# Patient Record
(demographics unavailable — no encounter records)

---

## 2017-08-27 NOTE — EMERGENCY ROOM REPORT
History of Present Illness


General


Chief Complaint:  Pain


Source:  Patient





Present Illness


HPI


Patient was dragging groceries up stairs and twisted her knee.  She heard a 

pop.  It started swelling last night and there was pain.  She is unable to bend 

the knee fully.  She was using an immobilizer and ice last night and the 

swelling went down a bit.  She still has pain and limited motion.  Is unable to 

get into the car because of immobilizer.  She's had an Ace wrap on after that.  

The pain is 8/10.  It's worse when she tries to bend her knee and weight bear.  

It's aching and sharp.  It does not radiate.





The patient had meniscal surgery a month ago.  She was just finishing up a 

physical therapy.  She's not been able to go back to work because of prior 

injury.





No fevers, chills, dyspnea, rashes, blood thinners, cough, hemoptysis.





She is decreasing her smoking.


Allergies:  


Coded Allergies:  


     MEPERIDINE (Verified  Allergy, Intermediate, Hives, 2/25/13)


 COPIED FROM UNCODED SECTION


     PANTOPRAZOLE (Verified  Allergy, Intermediate, 7/16/17)


     MORPHINE (Verified  Allergy, Unknown, ITCHING, 7/16/17)


     METOCLOPRAMIDE (Verified  Adverse Reaction, Intermediate, "MY MUSCLE 

MUSCLE ARE FREEZING UP", 2/25/13)


 COPIED FROM UNCODED SECTION





Patient History


Past Medical History:  see triage record


Past Surgical History:  other - L knee surgery


Social History:  Reports: smoking


Social History Narrative


Veterinary tech


Pregnant Now:  No


Reviewed Nursing Documentation:  PMH: Agreed, PSxH: Agreed





Nursing Documentation-PMH


Hx Cardiac Problems:  No


Hx Hypertension:  No


Hx Asthma:  No


Hx COPD:  No


Hx Diabetes:  No


Hx Cancer:  No


Hx Gastrointestinal Problems:  Yes - ulcer


Hx Dialysis:  No


Hx Neurological Problems:  No


Hx Cerebrovascular Accident:  No


Hx Transient Ischemic Attacks:  No


Hx Dementia:  No


Hx Alzheimer's Disease:  No


Hx Parkinson's Disease:  No


Hx Meningitis:  No


Hx Encephalitis:  No


Hx Seizures:  No


Hx Epilepsy:  No


Hx Multiple Sclerosis:  No


Hx Cerebral Palsy:  No


Hx Amyotrophic Lat Sclerosis:  No


Hx Guillian-[place] Syndrome:  No


Hx Paralysis:  No


Hx Peripheral Neuropathy:  No


Hx Spinal Cord Injury:  No


Hx Head Trauma:  No


Hx Traumatic Brain Injury:  No


Hx Memory Loss:  No


Hx Concentration Difficulty:  No


Hx Speech Problem:  No


Hx Tremors:  No


Hx Vertigo:  No


Hx Dizziness:  No


Hx Syncope:  No


Hx Headaches:  Yes - POST SUBDURAL HEMATOMA REMOVAL


Hx Aphasia:  No


Hx Dysphasia:  No


Hx Numbness:  No


Hx Weakness:  No


Hx Fatigue:  No


Hx Neurologic Surgery:  Yes - HAD SUBDURAL HEMATOMA REMOVED


Hx Brain Shunt:  No





Review of Systems


Constitutional:  Denies: fever


Respiratory:  Denies: shortness of breath


Cardiovascular:  Denies: chest pain


Gastrointestinal:  Denies: nausea


Musculoskeletal:  Reports: see HPI


Skin:  Denies: rash


Psychiatric:  Reports: depressed feelings


Neurological:  Denies: numbness


All Other Systems:  negative except mentioned in HPI





Physical Exam





Vital Signs








  Date Time  Temp Pulse Resp B/P (MAP) Pulse Ox O2 Delivery O2 Flow Rate FiO2


 


8/27/17 13:40 98.2 95 15 103/63 99 Room Air  








Sp02 EP Interpretation:  reviewed, normal


General Appearance:  well appearing, no apparent distress, GCS 15


Head:  normocephalic, atraumatic


Eyes:  bilateral eye normal inspection, bilateral eye PERRL


ENT:  hearing grossly normal, normal voice, moist mucus membranes


Neck:  full range of motion, supple


Respiratory:  no respiratory distress, speaking full sentences


Cardiovascular #2:  2+ dorsalis pedis (L)


Musculoskeletal:  back normal, digits/nails normal, decreased range of mation, 

swelling, other - Medial tenderness.  Lateral laxity of ligamentous still 

intact.  Minimal effusion.  Minimal drawer sign.  Positive Apley's compression 

medially appeared


Neurologic:  alert, motor strength/tone normal, sensory intact, grossly normal


Psychiatric:  mood/affect normal - tearful


Skin:  no rash





Medical Decision Making


Diagnostic Impression:  


 Primary Impression:  


 Cartilage tear


 Additional Impression:  


 Knee sprain


 Qualified Codes:  S83.412A - Sprain of medial collateral ligament of left knee

, initial encounter


ER Course


The patient presents with a left knee injury.  She had recent surgery.  Her 

exam is consistent with a meniscal injury and medial collateral ligament sprain 

at this time.  X-rays are indicated to exclude fracture.  In addition she'll be 

treated with pain medication and stabilization the knee.  She does have an 

immobilizer and crutches at home.





X-ray does not reveal fracture.  There is a small effusion.





Ace wrap is applied by me and tension and position excellent -  neurovascular 

check by me normal.





Patient is stable for outpatient observation and treatment.


Other X-Ray Diagnostic Results


Other X-Ray Diagnostic Results :  


   X-Ray ordered:  L knee


   # of Views/Limited Vs Complete:  3 View


   Indication:  Pain


   EP Interpretation:  Yes


   Interpretation:  no dislocation, no fractures, other - STS


   Impression:  Other


   Interpreting ER Provider:  Electronically signed by Handy Pepe MD





Last Vital Signs








  Date Time  Temp Pulse Resp B/P (MAP) Pulse Ox O2 Delivery O2 Flow Rate FiO2


 


8/27/17 15:15 98.2 95 15 103/63 99 Room Air  








Status:  improved


Disposition:  HOME, SELF-CARE


Condition:  Improved


Scripts


Hydrocodone Bit/Acetaminophen 5-325* (NORCO 5-325*) 1 Each Tablet


1 TAB ORAL Q6H Y for For Pain, #16 TAB 0 Refills


   Prov: Handy Pepe M.D.         8/27/17 


Ibuprofen* (MOTRIN*) 600 Mg Tablet


600 MG ORAL Q6H Y for For Pain, #20 TAB


   Prov: Handy Pepe M.D.         8/27/17











Handy Pepe M.D. Aug 27, 2017 14:31

## 2017-10-22 NOTE — CARDIOLOGY REPORT
--------------- APPROVED REPORT --------------





EKG Measurement

Heart [id number]

DC [id number]

[id number]

[id number]

[id number]





Normal sinus rhythm

Normal ECG

## 2017-12-31 NOTE — EMERGENCY ROOM REPORT
History of Present Illness


General


Chief Complaint:  Gastrointestinal Bleed


Source:  Patient





Present Illness


HPI


Patient presents with complaints of epigastric abdominal pain


Patient reports that on one vomiting episode she has noticed some trace of blood


Denies any lower abdominal pain or rectal bleeding


Denies any flank pain


Patient provide significant history of peptic ulcer disease and gastric bypass





Denies any neck pain or photophobia denies any recent travel denies any trauma 

pain is 6/10 epigastric


Allergies:  


Coded Allergies:  


     MEPERIDINE (Verified  Allergy, Intermediate, Hives, 2/25/13)


 COPIED FROM UNCODED SECTION


     PANTOPRAZOLE (Verified  Allergy, Intermediate, 7/16/17)


     MORPHINE (Verified  Allergy, Unknown, ITCHING, 7/16/17)


     METOCLOPRAMIDE (Verified  Adverse Reaction, Intermediate, "MY MUSCLE 

MUSCLE ARE FREEZING UP", 2/25/13)


 COPIED FROM UNCODED SECTION





Patient History


Past Medical History:  see triage record


Pertinent Family History:  none


Last Menstrual Period:  na


Pregnant Now:  No


Reviewed Nursing Documentation:  PMH: Agreed, PSxH: Agreed





Nursing Documentation-PMH


Past Medical History:  No History, Except For


Hx Cardiac Problems:  No


Hx Hypertension:  No


Hx Asthma:  No


Hx COPD:  No


Hx Diabetes:  No


Hx Cancer:  No


Hx Gastrointestinal Problems:  Yes - ulcer, gastrectomy


Hx Dialysis:  No


Hx Neurological Problems:  No


Hx Cerebrovascular Accident:  No


Hx Transient Ischemic Attacks:  No


Hx Dementia:  No


Hx Alzheimer's Disease:  No


Hx Parkinson's Disease:  No


Hx Meningitis:  No


Hx Encephalitis:  No


Hx Seizures:  No


Hx Epilepsy:  No


Hx Multiple Sclerosis:  No


Hx Cerebral Palsy:  No


Hx Amyotrophic Lat Sclerosis:  No


Hx Guillian-[place] Syndrome:  No


Hx Paralysis:  No


Hx Peripheral Neuropathy:  No


Hx Spinal Cord Injury:  No


Hx Head Trauma:  No


Hx Traumatic Brain Injury:  No


Hx Memory Loss:  No


Hx Concentration Difficulty:  No


Hx Speech Problem:  No


Hx Tremors:  No


Hx Vertigo:  No


Hx Dizziness:  No


Hx Syncope:  No


Hx Headaches:  Yes - POST SUBDURAL HEMATOMA REMOVAL


Hx Aphasia:  No


Hx Dysphasia:  No


Hx Numbness:  No


Hx Weakness:  No


Hx Fatigue:  No


Hx Neurologic Surgery:  Yes - HAD SUBDURAL HEMATOMA REMOVED


Hx Brain Shunt:  No





Review of Systems


All Other Systems:  negative except mentioned in HPI





Physical Exam





Vital Signs








  Date Time  Temp Pulse Resp B/P (MAP) Pulse Ox O2 Delivery O2 Flow Rate FiO2


 


12/31/17 11:13 97.3 115 20 119/68 99 Room Air  








Sp02 EP Interpretation:  reviewed, normal


General Appearance:  well appearing, no apparent distress


Head:  normocephalic, atraumatic


Eyes:  bilateral eye PERRL, bilateral eye EOMI


ENT:  hearing grossly normal, normal pharynx, TMs + canals normal, uvula midline


Neck:  full range of motion, supple, no meningismus, no bony tend


Respiratory:  lungs clear, normal breath sounds, no rhonchi, no respiratory 

distress, no retraction, no accessory muscle use


Cardiovascular #1:  normal peripheral pulses, regular rate, rhythm, no edema, 

no gallop, no JVD, no murmur


Gastrointestinal:  normal bowel sounds, non tender - On clinical exam however 

subjectively uncomfortable epigastric region, soft, no mass, no organomegaly, 

non-distended, no guarding, no hernia, no pulsatile mass, no rebound


Genitourinary:  no CVA tenderness


Musculoskeletal:  normal inspection


Neurologic:  oriented x3, responsive, CNs III-XII nml as tested, motor strength/

tone normal, sensory intact


Psychiatric:  mood/affect normal


Skin:  normal color, no rash, warm/dry, palpation normal


Lymphatic:  normal inspection, no adenopathy





Medical Decision Making


Diagnostic Impression:  


 Primary Impression:  


 abdominal pain


ER Course


With the history exam and presentation, multiple differentials considered, 

including but not limited to appendicitis, gastritis, cholecystitis, 

diverticulitis





Patient appears well-hydrated


At this time is allergic to morphine reports that Dilaudid has worked in the 

past


Patient on CURES reveals multiple medications other different providers


I did discuss this with the patient my concern for this


Patient's hemoglobin is the same as a 4 months ago likelihood of acute 

hemorrhage is low patient maintained in a stable and requires improved 

outpatient followup





Labs








Test


  12/31/17


12:30


 


White Blood Count


  4.3 K/UL


(4.8-10.8)


 


Red Blood Count


  4.42 M/UL


(4.20-5.40)


 


Hemoglobin


  11.4 G/DL


(12.0-16.0)


 


Hematocrit


  37.2 %


(37.0-47.0)


 


Mean Corpuscular Volume 84 FL (80-99) 


 


Mean Corpuscular Hemoglobin


  25.8 PG


(27.0-31.0)


 


Mean Corpuscular Hemoglobin


Concent 30.7 G/DL


(32.0-36.0)


 


Red Cell Distribution Width


  14.5 %


(11.6-14.8)


 


Platelet Count


  237 K/UL


(150-450)


 


Mean Platelet Volume


  7.8 FL


(6.5-10.1)


 


Neutrophils (%) (Auto)


  52.3 %


(45.0-75.0)


 


Lymphocytes (%) (Auto)


  37.2 %


(20.0-45.0)


 


Monocytes (%) (Auto)


  6.8 %


(1.0-10.0)


 


Eosinophils (%) (Auto)


  2.3 %


(0.0-3.0)


 


Basophils (%) (Auto)


  1.4 %


(0.0-2.0)


 


Sodium Level


  140 MMOL/L


(136-145)


 


Potassium Level


  4.3 MMOL/L


(3.5-5.1)


 


Chloride Level


  106 MMOL/L


()


 


Carbon Dioxide Level


  21 MMOL/L


(21-32)


 


Anion Gap


  13 mmol/L


(5-15)


 


Blood Urea Nitrogen


  11 mg/dL


(7-18)


 


Creatinine


  0.6 MG/DL


(0.55-1.30)


 


Estimat Glomerular Filtration


Rate > 60 mL/min


(>60)


 


Glucose Level


  66 MG/DL


()


 


Calcium Level


  8.0 MG/DL


(8.5-10.1)


 


Total Bilirubin


  0.3 MG/DL


(0.2-1.0)


 


Aspartate Amino Transf


(AST/SGOT) 35 U/L (15-37) 


 


 


Alanine Aminotransferase


(ALT/SGPT) 50 U/L (12-78) 


 


 


Alkaline Phosphatase


  123 U/L


()


 


Total Protein


  7.6 G/DL


(6.4-8.2)


 


Albumin


  3.9 G/DL


(3.4-5.0)


 


Globulin 3.7 g/dL 


 


Albumin/Globulin Ratio 1.1 (1.0-2.7) 


 


Lipase


  150 U/L


()








Rhythm Strip Diag. Results


EP Interpretation:  yes


Rate:  77


Rhythm:  NSR, no PVC's, no ectopy





Last Vital Signs








  Date Time  Temp Pulse Resp B/P (MAP) Pulse Ox O2 Delivery O2 Flow Rate FiO2


 


12/31/17 13:27 97.3 95 8 111/64 99 Room Air  








Status:  improved


Disposition:  HOME, SELF-CARE


Condition:  Stable


Scripts


Ondansetron Odt* (ZOFRAN ODT*) 4 Mg Tab.rapdis


4 MG ORAL Q6H Y for Nausea & Vomiting, #10 TAB 0 Refills


   Prov: YOANNA FARIAS D.O.         12/31/17


Referrals:  


NOT CHOSEN [facility]/MD,REFERRING (PCP)


Patient Instructions:  Abdominal Pain, Adult





Additional Instructions:  


Patient is provided with the discharge instructions notified to follow up with 

primary doctor in the next 2-3 days otherwise return to the er with any 

worsening symptoms.


Please note that this report is being documented using DRAGON technology.  This 

can lead to erroneous entry secondary to incorrect interpretation by the 

dictating instrument.











YOANNA FARIAS D.O. Dec 31, 2017 13:31